# Patient Record
Sex: MALE | Race: WHITE | NOT HISPANIC OR LATINO | Employment: OTHER | URBAN - METROPOLITAN AREA
[De-identification: names, ages, dates, MRNs, and addresses within clinical notes are randomized per-mention and may not be internally consistent; named-entity substitution may affect disease eponyms.]

---

## 2017-01-24 ENCOUNTER — HOSPITAL ENCOUNTER (OUTPATIENT)
Dept: ORTHOPEDIC SURGERY | Facility: CLINIC | Age: 72
Discharge: HOME | End: 2017-01-24
Attending: ORTHOPAEDIC SURGERY | Admitting: ORTHOPAEDIC SURGERY

## 2017-04-07 ENCOUNTER — HOSPITAL ENCOUNTER (OUTPATIENT)
Dept: ORTHOPEDIC SURGERY | Facility: CLINIC | Age: 72
Discharge: HOME | End: 2017-04-07
Attending: PHYSICIAN ASSISTANT | Admitting: PHYSICIAN ASSISTANT

## 2018-03-22 ENCOUNTER — HOSPITAL ENCOUNTER (OUTPATIENT)
Dept: ORTHOPEDIC SURGERY | Facility: CLINIC | Age: 73
Discharge: HOME | End: 2018-03-22
Attending: ORTHOPAEDIC SURGERY | Admitting: ORTHOPAEDIC SURGERY

## 2022-05-20 ENCOUNTER — OFFICE VISIT (OUTPATIENT)
Dept: ORTHOPEDIC SURGERY | Facility: CLINIC | Age: 77
End: 2022-05-20

## 2022-05-20 VITALS
HEIGHT: 73 IN | BODY MASS INDEX: 26.19 KG/M2 | SYSTOLIC BLOOD PRESSURE: 163 MMHG | DIASTOLIC BLOOD PRESSURE: 107 MMHG | HEART RATE: 99 BPM | WEIGHT: 197.6 LBS

## 2022-05-20 DIAGNOSIS — M71.21 BAKER'S CYST OF KNEE, RIGHT: ICD-10-CM

## 2022-05-20 DIAGNOSIS — E66.3 OVERWEIGHT (BMI 25.0-29.9): ICD-10-CM

## 2022-05-20 DIAGNOSIS — M17.11 PRIMARY OSTEOARTHRITIS OF RIGHT KNEE: Primary | ICD-10-CM

## 2022-05-20 PROBLEM — Z87.891 HISTORY OF TOBACCO USE: Status: ACTIVE | Noted: 2022-05-20

## 2022-05-20 PROBLEM — G56.00 CARPAL TUNNEL SYNDROME: Status: ACTIVE | Noted: 2022-05-20

## 2022-05-20 PROCEDURE — 99203 OFFICE O/P NEW LOW 30 MIN: CPT | Performed by: PHYSICIAN ASSISTANT

## 2022-05-20 PROCEDURE — 20611 DRAIN/INJ JOINT/BURSA W/US: CPT | Performed by: PHYSICIAN ASSISTANT

## 2022-05-20 RX ORDER — LISINOPRIL AND HYDROCHLOROTHIAZIDE 12.5; 1 MG/1; MG/1
1 TABLET ORAL DAILY
COMMUNITY
Start: 2022-03-28

## 2022-05-20 RX ORDER — MULTIVITAMIN
1 CAPSULE ORAL DAILY
COMMUNITY
Start: 2016-07-01 | End: 2023-03-03

## 2022-05-20 RX ORDER — LIDOCAINE HYDROCHLORIDE 10 MG/ML
3 INJECTION, SOLUTION EPIDURAL; INFILTRATION; INTRACAUDAL; PERINEURAL
Status: COMPLETED | OUTPATIENT
Start: 2022-05-20 | End: 2022-05-20

## 2022-05-20 RX ADMIN — LIDOCAINE HYDROCHLORIDE 3 ML: 10 INJECTION, SOLUTION EPIDURAL; INFILTRATION; INTRACAUDAL; PERINEURAL at 10:08

## 2022-05-20 NOTE — PROGRESS NOTES
ORTHOPEDIC VISIT    Referring Provider: Referring  Primary Care Provider: Provider, No Known         Subjective:  Chief Complaint:  Chief Complaint   Patient presents with   • Right Knee - Initial Evaluation     X several months, piece of wood fell off truck on to knee, swelling behind knee per patient. Saw Ortho in [place] 2022 requested they drain back of knee, states would only give steroid shot. Shot did not work. Wants back of knee drained today.       HPI:  Amauri Espinal is a 76 y.o. male who presents for his initial visit for right knee pain ongoing since October when he dropped a stack of wood on his knee.  He describes a very intermittent dull, achy pain, mainly in the medial compartment and posteriorly.  He denies any radiation, numbness, or tingling.  He denies any mechanical symptoms or instability.  He reports that his pain did increase in February after an intra-articular steroid injection.  He does use anti-inflammatories as needed, which does help with his discomfort.  He denies any previous history of surgery on the knee.    Past Medical History:   Diagnosis Date   • Arthritis    • Hypertension        Past Surgical History:   Procedure Laterality Date   • BACK SURGERY     • CARPAL TUNNEL RELEASE Right     Dr. Ordonez   • TOTAL HIP ARTHROPLASTY Right 2016    Dr. Mon   • TOTAL HIP ARTHROPLASTY Left 2017    Dr. oMn   • TRIGGER FINGER RELEASE Right     Dr. Ordonez       Family History   Problem Relation Age of Onset   • Arthritis Father        Social History     Occupational History   • Not on file   Tobacco Use   • Smoking status: Former Smoker     Packs/day: 1.00     Years: 30.00     Pack years: 30.00     Types: Cigarettes     Quit date:      Years since quittin.4   • Smokeless tobacco: Never Used   Vaping Use   • Vaping Use: Never used   Substance and Sexual Activity   • Alcohol use: Never   • Drug use: Never   • Sexual activity: Defer        Medications:    Current  "Outpatient Medications:   •  lisinopril-hydrochlorothiazide (PRINZIDE,ZESTORETIC) 10-12.5 MG per tablet, Take 1 tablet by mouth Daily., Disp: , Rfl:   •  Multiple Vitamin (multivitamin) capsule, Take 1 capsule by mouth Daily., Disp: , Rfl:   •  piroxicam (FELDENE) 20 MG capsule, Take 1 capsule by mouth Daily., Disp: , Rfl:     Allergies:  No Known Allergies      Review of Systems:  Gen -no fever, chills , sweats, headache   Eyes - no irritation or discharge   ENT -  no ear pain , runny nose , sore throat , difficulty swallowing   Resp - no cough , congestion , excessive expectoration   CVS - no chest pain , palpitations.   Abd - no pain , nausea , vomiting , diarrhea   Skin - no rash , lesions.   Neuro - no dizziness    Please see HPI for any other pertinent positives.  All other systems were reviewed and are negative.       Objective   Objective:    BP (!) 163/107 (BP Location: Left arm, Patient Position: Sitting, Cuff Size: Large Adult)   Pulse 99   Ht 185.4 cm (73\")   Wt 89.6 kg (197 lb 9.6 oz)   BMI 26.07 kg/m²     Physical Examination:  Alert, oriented, overweight individual in no acute distress, ambulating unassisted  Right lower extremity shows no erythema, rashes, or open skin lesions. There is a minimal amount of swelling, however there is a palpable Baker's cyst posteriorly.  There is a varus malalignment present, and the patient is neurovascularly intact distally. The knee is stable to varus and valgus stress, there is no patellar maltracking noted, and plantar and dorsiflexion is 5/5.  There is crepitus noted.  There is no tenderness to palpation or with range of motion, which is about 0-115.           Imaging:  xrays obtained today  right Knee X-Ray    Date of exam: 5/20/2022    Indication: Right knee pain    AP, Lateral, Ronco views    Findings:Shows moderate to severe tricompartmental DJD, worse in the medial compartment, There is subchondral sclerosis, subchondral cysts, and osteophytosis " present., No fractures or dislocations are appreciated and There is a varus deformity.    decreased joint spaces    Hardware appropriately positioned not applicable    no prior studies available for comparison.    This patient's x-ray report was graded according to the Kellgren and Vickey classification.  This took into account the joint space narrowing, osteophyte formation, sclerosis of the distal femur/proximal tibia along with deformity of those bones.  The findings were indicative of K L grade 4.    X-RAY was ordered and reviewed by URIAH Blunt            Assessment:  1. Primary osteoarthritis of right knee    2. Baker's cyst of knee, right    3. Overweight (BMI 25.0-29.9)                 Plan:  Ultrasound-guided Baker's cyst aspiration today, risks and benefits were discussed and post aspiration instructions were given.  I did discuss with the patient that these often return, however he would still like the aspiration.  He was instructed to wrap the knee with an Ace wrap and continue compression for at least 3 days.  He may continue to use his anti-inflammatories as needed.  Weight loss is recommended.  He may follow-up as needed.  Natural history and expected course discussed. Questions answered.  Educational materials distributed.  Rest, ice, compression, and elevation (RICE) therapy.  OTC analgesics as needed.  Arthrocentesis. See procedure note.  cortisone injections  viscosupplementation  physical therapy  bracing  weight loss  activtiy modification  - Large Joint Arthrocentesis: R knee (Rubio's cyst) on 5/20/2022 10:08 AM  Indications: pain and joint swelling  Details: 18 G needle, ultrasound-guided posterior approach  Medications: 3 mL lidocaine PF 1% 1 %  Aspirate: 20 mL clear, serous and yellow  Outcome: tolerated well, no immediate complications  Procedure, treatment alternatives, risks and benefits explained, specific risks discussed. Consent was given by the patient. Immediately prior to  "procedure a time out was called to verify the correct patient, procedure, equipment, support staff and site/side marked as required. Patient was prepped and draped in the usual sterile fashion.                    URIAH Blunt  05/20/22  10:11 EDT    \"Please note that portions of this note were completed with a voice recognition program\".   "

## 2022-08-22 ENCOUNTER — OFFICE VISIT (OUTPATIENT)
Dept: ORTHOPEDIC SURGERY | Facility: CLINIC | Age: 77
End: 2022-08-22

## 2022-08-22 VITALS — OXYGEN SATURATION: 100 % | BODY MASS INDEX: 26.11 KG/M2 | HEIGHT: 73 IN | TEMPERATURE: 103 F | WEIGHT: 197 LBS

## 2022-08-22 DIAGNOSIS — M17.11 PRIMARY OSTEOARTHRITIS OF RIGHT KNEE: Primary | ICD-10-CM

## 2022-08-22 DIAGNOSIS — E66.3 OVERWEIGHT (BMI 25.0-29.9): ICD-10-CM

## 2022-08-22 DIAGNOSIS — M71.21 BAKER'S CYST OF KNEE, RIGHT: ICD-10-CM

## 2022-08-22 PROCEDURE — 20610 DRAIN/INJ JOINT/BURSA W/O US: CPT | Performed by: PHYSICIAN ASSISTANT

## 2022-08-22 PROCEDURE — 99213 OFFICE O/P EST LOW 20 MIN: CPT | Performed by: PHYSICIAN ASSISTANT

## 2022-08-22 PROCEDURE — 20611 DRAIN/INJ JOINT/BURSA W/US: CPT | Performed by: PHYSICIAN ASSISTANT

## 2022-08-22 RX ORDER — DICLOFENAC SODIUM 75 MG/1
75 TABLET, DELAYED RELEASE ORAL 2 TIMES DAILY
Qty: 60 TABLET | Refills: 2 | Status: SHIPPED | OUTPATIENT
Start: 2022-08-22 | End: 2022-11-16

## 2022-08-22 RX ORDER — LIDOCAINE HYDROCHLORIDE 10 MG/ML
3 INJECTION, SOLUTION EPIDURAL; INFILTRATION; INTRACAUDAL; PERINEURAL
Status: COMPLETED | OUTPATIENT
Start: 2022-08-22 | End: 2022-08-22

## 2022-08-22 RX ORDER — LIDOCAINE HYDROCHLORIDE 10 MG/ML
2 INJECTION, SOLUTION EPIDURAL; INFILTRATION; INTRACAUDAL; PERINEURAL
Status: COMPLETED | OUTPATIENT
Start: 2022-08-22 | End: 2022-08-22

## 2022-08-22 RX ORDER — MELOXICAM 15 MG/1
TABLET ORAL
COMMUNITY
Start: 2022-07-12 | End: 2022-08-22 | Stop reason: ALTCHOICE

## 2022-08-22 RX ORDER — TRIAMCINOLONE ACETONIDE 40 MG/ML
80 INJECTION, SUSPENSION INTRA-ARTICULAR; INTRAMUSCULAR
Status: COMPLETED | OUTPATIENT
Start: 2022-08-22 | End: 2022-08-22

## 2022-08-22 RX ADMIN — TRIAMCINOLONE ACETONIDE 80 MG: 40 INJECTION, SUSPENSION INTRA-ARTICULAR; INTRAMUSCULAR at 17:19

## 2022-08-22 RX ADMIN — LIDOCAINE HYDROCHLORIDE 3 ML: 10 INJECTION, SOLUTION EPIDURAL; INFILTRATION; INTRACAUDAL; PERINEURAL at 17:18

## 2022-08-22 RX ADMIN — LIDOCAINE HYDROCHLORIDE 2 ML: 10 INJECTION, SOLUTION EPIDURAL; INFILTRATION; INTRACAUDAL; PERINEURAL at 17:19

## 2022-08-22 NOTE — PROGRESS NOTES
ORTHO FOLLOW UP       Subjective:    HPI:   Amauri Espinal is a 76 y.o. male who presents in follow-up for his right knee pain with a known history of right knee DJD and Baker's cyst.  He did have a Baker's cyst aspiration on 2022, which did help to reduce his pain.  But he does report that the fluid did slowly return with time.  He states that over the last few weeks his pain has increased over the medial and posterior aspects of the knee.  He is currently taking meloxicam, but has not seen a significant difference in his pain.      Past Medical History:   Diagnosis Date   • Arthritis    • Baker's cyst of knee, right 2022   • Hypertension    • Primary osteoarthritis of right knee 2022       Past Surgical History:   Procedure Laterality Date   • BACK SURGERY     • CARPAL TUNNEL RELEASE Right     Dr. Ordonez   • TOTAL HIP ARTHROPLASTY Right 2016    Dr. Mon   • TOTAL HIP ARTHROPLASTY Left 2017    Dr. Mon   • TRIGGER FINGER RELEASE Right     Dr. Ordonez       Social History     Occupational History   • Not on file   Tobacco Use   • Smoking status: Former Smoker     Packs/day: 1.00     Years: 30.00     Pack years: 30.00     Types: Cigarettes     Quit date:      Years since quittin.6   • Smokeless tobacco: Never Used   Vaping Use   • Vaping Use: Never used   Substance and Sexual Activity   • Alcohol use: Never   • Drug use: Never   • Sexual activity: Defer      The following portions of the patient's history were reviewed and updated as appropriate: allergies, current medications, past family history, past medical history, past social history, past surgical history and problem list.    Medications:    Current Outpatient Medications:   •  lisinopril-hydrochlorothiazide (PRINZIDE,ZESTORETIC) 10-12.5 MG per tablet, Take 1 tablet by mouth Daily., Disp: , Rfl:   •  Multiple Vitamin (multivitamin) capsule, Take 1 capsule by mouth Daily., Disp: , Rfl:   •  diclofenac (VOLTAREN) 75 MG  "EC tablet, Take 1 tablet by mouth 2 (Two) Times a Day. Prn joint pain, Disp: 60 tablet, Rfl: 2    Allergies:  No Known Allergies    Review of Systems:  Gen -no fever, chills , sweats, headache   Eyes - no irritation or discharge   ENT -  no ear pain , runny nose , sore throat , difficulty swallowing   Resp - no cough , congestion , excessive expectoration   CVS - no chest pain , palpitations.   Abd - no pain , nausea , vomiting , diarrhea   Skin - no rash , lesions.   Neuro - no dizziness    Please see HPI for any other pertinent positives.  All other systems were reviewed and are negative.       Objective   Objective:    Temp (!) 103 °F (39.4 °C)   Ht 185.4 cm (73\")   Wt 89.4 kg (197 lb)   SpO2 100%   BMI 25.99 kg/m²     Physical Examination:  Alert, oriented, overweight individual in no acute distress, ambulating unassisted  Right lower extremity shows no erythema, rashes, or open skin lesions. There is a minimal amount of swelling, however there is a palpable Baker's cyst posteriorly.  There is a varus malalignment present, and the patient is neurovascularly intact distally. The knee is stable to varus and valgus stress, there is no patellar maltracking noted, and plantar and dorsiflexion is 5/5.  There is crepitus noted.  There is no tenderness to palpation or with range of motion, which is about 0-115.         Imaging:  no diagnostic testing performed this visit            Assessment:  1. Primary osteoarthritis of right knee    2. Baker's cyst of knee, right    3. Overweight (BMI 25.0-29.9)                 Plan:   Ultrasound-guided Baker's cyst aspiration today, risks and benefits were discussed and post aspiration instructions were given.  I did discuss with the patient that these often return, however he would still like the aspiration.  I am also recommending conservative treatment for his arthritis.  He will be fitted for hinged knee brace today to help with stability and fall prevention.  I have asked " him to stop the meloxicam, and we will try diclofenac.  Intra-articular steroid injection today, risks and benefits were discussed and postinjection instructions were given.  I will plan to see him back in 3 months and as needed.  He should call with any questions or concerns.  Natural history and expected course discussed. Questions answered.  Educational materials distributed.  Rest, ice, compression, and elevation (RICE) therapy.  NSAIDs per medication orders.  OTC analgesics as needed.  Arthrocentesis. See procedure note.  cortisone injections  viscosupplementation  physical therapy  bracing  weight loss  activtiy modification  assistive devices  - Large Joint Arthrocentesis: R knee (Rubio's cyst) on 8/22/2022 5:18 PM  Indications: pain and joint swelling  Details: 18 G needle, ultrasound-guided posterior approach  Medications: 3 mL lidocaine PF 1% 1 %  Aspirate: 15 mL clear, serous and yellow  Outcome: tolerated well, no immediate complications  Procedure, treatment alternatives, risks and benefits explained, specific risks discussed. Consent was given by the patient. Immediately prior to procedure a time out was called to verify the correct patient, procedure, equipment, support staff and site/side marked as required. Patient was prepped and draped in the usual sterile fashion.     - Large Joint Arthrocentesis: R knee on 8/22/2022 5:19 PM  Indications: pain  Details: 25 G needle, anterolateral approach  Medications: 2 mL lidocaine PF 1% 1 %; 80 mg triamcinolone acetonide 40 MG/ML  Outcome: tolerated well, no immediate complications  Procedure, treatment alternatives, risks and benefits explained, specific risks discussed. Consent was given by the patient. Immediately prior to procedure a time out was called to verify the correct patient, procedure, equipment, support staff and site/side marked as required. Patient was prepped and draped in the usual sterile fashion.                      Sigrid Tesfaye,  "PA  08/22/22  17:14 EDT    \"Please note that portions of this note were completed with a voice recognition program\".   "

## 2022-11-16 DIAGNOSIS — M17.11 PRIMARY OSTEOARTHRITIS OF RIGHT KNEE: ICD-10-CM

## 2022-11-16 RX ORDER — DICLOFENAC SODIUM 75 MG/1
TABLET, DELAYED RELEASE ORAL
Qty: 60 TABLET | Refills: 11 | Status: SHIPPED | OUTPATIENT
Start: 2022-11-16

## 2022-12-02 ENCOUNTER — OFFICE VISIT (OUTPATIENT)
Dept: ORTHOPEDIC SURGERY | Facility: CLINIC | Age: 77
End: 2022-12-02

## 2022-12-02 VITALS — WEIGHT: 197 LBS | HEIGHT: 73 IN | BODY MASS INDEX: 26.11 KG/M2 | OXYGEN SATURATION: 97 % | HEART RATE: 84 BPM

## 2022-12-02 DIAGNOSIS — M71.21 BAKER'S CYST OF KNEE, RIGHT: ICD-10-CM

## 2022-12-02 DIAGNOSIS — M17.11 PRIMARY OSTEOARTHRITIS OF RIGHT KNEE: Primary | ICD-10-CM

## 2022-12-02 DIAGNOSIS — E66.3 OVERWEIGHT (BMI 25.0-29.9): ICD-10-CM

## 2022-12-02 PROCEDURE — 20610 DRAIN/INJ JOINT/BURSA W/O US: CPT | Performed by: PHYSICIAN ASSISTANT

## 2022-12-02 RX ORDER — LIDOCAINE HYDROCHLORIDE 10 MG/ML
2 INJECTION, SOLUTION EPIDURAL; INFILTRATION; INTRACAUDAL; PERINEURAL
Status: COMPLETED | OUTPATIENT
Start: 2022-12-02 | End: 2022-12-02

## 2022-12-02 RX ORDER — TRIAMCINOLONE ACETONIDE 40 MG/ML
80 INJECTION, SUSPENSION INTRA-ARTICULAR; INTRAMUSCULAR
Status: COMPLETED | OUTPATIENT
Start: 2022-12-02 | End: 2022-12-02

## 2022-12-02 RX ADMIN — LIDOCAINE HYDROCHLORIDE 2 ML: 10 INJECTION, SOLUTION EPIDURAL; INFILTRATION; INTRACAUDAL; PERINEURAL at 09:55

## 2022-12-02 RX ADMIN — TRIAMCINOLONE ACETONIDE 80 MG: 40 INJECTION, SUSPENSION INTRA-ARTICULAR; INTRAMUSCULAR at 09:55

## 2022-12-02 NOTE — PROGRESS NOTES
"    ORTHO FOLLOW UP       Subjective:    HPI:   Amauri Espinal is a 77 y.o. male who presents in follow-up for his right knee pain with a known history of right knee DJD.  His last intra-articular steroid injection was on 2022, which significantly reduce his pain.      Past Medical History:   Diagnosis Date   • Arthritis    • Baker's cyst of knee, right 2022   • Hypertension    • Primary osteoarthritis of right knee 2022       Past Surgical History:   Procedure Laterality Date   • BACK SURGERY     • CARPAL TUNNEL RELEASE Right     Dr. Ordonez   • TOTAL HIP ARTHROPLASTY Right 2016    Dr. Mon   • TOTAL HIP ARTHROPLASTY Left 2017    Dr. Mon   • TRIGGER FINGER RELEASE Right     Dr. Ordonez       Social History     Occupational History   • Not on file   Tobacco Use   • Smoking status: Former     Packs/day: 1.00     Years: 30.00     Pack years: 30.00     Types: Cigarettes     Quit date:      Years since quittin.9   • Smokeless tobacco: Never   Vaping Use   • Vaping Use: Never used   Substance and Sexual Activity   • Alcohol use: Never   • Drug use: Never   • Sexual activity: Defer      The following portions of the patient's history were reviewed and updated as appropriate: allergies, current medications, past family history, past medical history, past social history, past surgical history and problem list.    Medications:    Current Outpatient Medications:   •  diclofenac (VOLTAREN) 75 MG EC tablet, TAKE 1 TABLET BY MOUTH TWICE DAILY AS NEEDED FOR JOINT PAIN, Disp: 60 tablet, Rfl: 11  •  lisinopril-hydrochlorothiazide (PRINZIDE,ZESTORETIC) 10-12.5 MG per tablet, Take 1 tablet by mouth Daily., Disp: , Rfl:   •  Multiple Vitamin (multivitamin) capsule, Take 1 capsule by mouth Daily., Disp: , Rfl:     Allergies:  No Known Allergies       Objective   Objective:    Pulse 84   Ht 185.4 cm (73\")   Wt 89.4 kg (197 lb)   SpO2 97%   BMI 25.99 kg/m²     Physical Examination:  Alert, " "oriented, overweight individual in no acute distress, ambulating unassisted  Right lower extremity shows no erythema, rashes, or open skin lesions. There is a mild amount of swelling. There is a varus malalignment present, and the patient is neurovascularly intact distally. The knee is stable to varus and valgus stress, there is no patellar maltracking noted, and plantar and dorsiflexion is 5/5.  There is crepitus noted.  There is no tenderness to palpation or with range of motion, which is about 0-115.         Imaging:  no diagnostic testing performed this visit            Assessment:  1. Primary osteoarthritis of right knee    2. Baker's cyst of knee, right    3. Overweight (BMI 25.0-29.9)                 Plan:  Intra-articular steroid injection today, risks and benefits were discussed and postinjection instructions were given.  Continue bracing.  Follow-up in 3 months for recheck.  - Large Joint Arthrocentesis: R knee on 12/2/2022 9:55 AM  Indications: pain  Details: 25 G needle, anterolateral approach  Medications: 2 mL lidocaine PF 1% 1 %; 80 mg triamcinolone acetonide 40 MG/ML  Outcome: tolerated well, no immediate complications  Procedure, treatment alternatives, risks and benefits explained, specific risks discussed. Consent was given by the patient. Immediately prior to procedure a time out was called to verify the correct patient, procedure, equipment, support staff and site/side marked as required. Patient was prepped and draped in the usual sterile fashion.                    URIAH Blunt  12/02/22  09:52 EST    \"Please note that portions of this note were completed with a voice recognition program\".   "

## 2023-03-03 ENCOUNTER — OFFICE VISIT (OUTPATIENT)
Dept: ORTHOPEDIC SURGERY | Facility: CLINIC | Age: 78
End: 2023-03-03
Payer: MEDICARE

## 2023-03-03 VITALS — HEART RATE: 72 BPM | WEIGHT: 197 LBS | OXYGEN SATURATION: 98 % | HEIGHT: 73 IN | BODY MASS INDEX: 26.11 KG/M2

## 2023-03-03 DIAGNOSIS — M17.11 PRIMARY OSTEOARTHRITIS OF RIGHT KNEE: Primary | ICD-10-CM

## 2023-03-03 PROCEDURE — 99213 OFFICE O/P EST LOW 20 MIN: CPT | Performed by: FAMILY MEDICINE

## 2023-03-03 PROCEDURE — 20610 DRAIN/INJ JOINT/BURSA W/O US: CPT | Performed by: FAMILY MEDICINE

## 2023-03-03 RX ADMIN — TRIAMCINOLONE ACETONIDE 80 MG: 40 INJECTION, SUSPENSION INTRA-ARTICULAR; INTRAMUSCULAR at 17:47

## 2023-03-03 NOTE — PROGRESS NOTES
Primary Care Sports Medicine Office Visit Note     Patient ID: Amauri Espinal is a 77 y.o. male.    Chief Complaint:  Chief Complaint   Patient presents with   • Right Knee - Pain, Initial Evaluation     HPI:    Mr. Amauri Espinal is a 77 y.o. male. The patient presents to the clinic today for repeat evaluation of his right knee. He is a former patient of Love Tesfaye PA-C. He most recently received a corticosteroid injection dated 2022.    The patient states that his right knee is bothering him a little bit. He reports that the injection helped. He states that on 2023, his right knee was very painful, but today it does not hurt much. He reports that he has not been on his right knee much today. He states that the injection really helps. He reports that it depends on what he does the day before, and if he is using his right knee a lot. He reports that if he is getting down and working on his right knee, his right knee is very painful the next day.    Past Medical History:   Diagnosis Date   • Arthritis    • Baker's cyst of knee, right 2022   • Hypertension    • Primary osteoarthritis of right knee 2022       Past Surgical History:   Procedure Laterality Date   • BACK SURGERY     • CARPAL TUNNEL RELEASE Right     Dr. Ordonez   • TOTAL HIP ARTHROPLASTY Right 2016    Dr. Mon   • TOTAL HIP ARTHROPLASTY Left 2017    Dr. Mon   • TRIGGER FINGER RELEASE Right     Dr. Ordonez       Family History   Problem Relation Age of Onset   • Arthritis Father      Social History     Occupational History   • Not on file   Tobacco Use   • Smoking status: Former     Packs/day: 1.00     Years: 30.00     Pack years: 30.00     Types: Cigarettes     Quit date:      Years since quittin.1   • Smokeless tobacco: Never   Vaping Use   • Vaping Use: Never used   Substance and Sexual Activity   • Alcohol use: Never   • Drug use: Never   • Sexual activity: Defer      Review of Systems   Constitutional:  "Negative for activity change, fatigue and fever.   Musculoskeletal: Positive for arthralgias.   Skin: Negative for color change and rash.   Neurological: Negative for numbness.     Objective:    Pulse 72   Ht 185.4 cm (73\")   Wt 89.4 kg (197 lb)   SpO2 98%   BMI 25.99 kg/m²     Physical Examination:  Physical Exam  Vitals and nursing note reviewed.   Constitutional:       General: He is not in acute distress.     Appearance: He is well-developed. He is not diaphoretic.   HENT:      Head: Normocephalic and atraumatic.   Eyes:      Conjunctiva/sclera: Conjunctivae normal.   Pulmonary:      Effort: Pulmonary effort is normal. No respiratory distress.   Musculoskeletal:      Right knee: No effusion.      Instability Tests: Medial Yessi test negative and lateral Yessi test negative.   Skin:     General: Skin is warm.      Capillary Refill: Capillary refill takes less than 2 seconds.   Neurological:      Mental Status: He is alert.       Right Ankle Exam     Range of Motion   The patient has normal right ankle ROM.      Right Knee Exam     Muscle Strength   The patient has normal right knee strength.    Tenderness   The patient is experiencing tenderness in the lateral joint line, medial joint line and patella.    Range of Motion   Extension: normal   Flexion: normal     Tests   Yessi:  Medial - negative Lateral - negative  Varus: negative Valgus: negative  Right knee patellar apprehension test: +patellar grind, +hilda king.    Other   Erythema: absent  Sensation: normal  Pulse: present (distal to the knee, DP and PT palpable)  Swelling: none  Effusion: no effusion present           Imaging and other tests:  No new imaging today.      Assessment and Plan:    1. Primary osteoarthritis of the right knee    After discussion of risks and benefits, the patient elected to proceed with corticosteroid injection to the right knee.  The patient tolerated this procedure well without any complaints or problems.  I " recommended continuation of conservative management as previous, return to clinic in 3-6 months or sooner if symptoms recur.    Transcribed from ambient dictation for Rachid Sawant II,  by Mgehan Cooper.  03/03/23   11:29 EST    Patient or patient representative verbalized consent to the visit recording.  I have personally performed the services described in this document as transcribed by the above individual, and it is both accurate and complete.    Disclaimer: Please note that areas of this note were completed with computer voice recognition software.  Quite often unanticipated grammatical, syntax, homophones, and other interpretive errors are inadvertently transcribed by the computer software. Please excuse any errors that have escaped final proofreading.

## 2023-03-06 RX ORDER — TRIAMCINOLONE ACETONIDE 40 MG/ML
80 INJECTION, SUSPENSION INTRA-ARTICULAR; INTRAMUSCULAR
Status: COMPLETED | OUTPATIENT
Start: 2023-03-03 | End: 2023-03-03

## 2023-03-06 NOTE — PROGRESS NOTES
Procedure   Large Joint Arthrocentesis: R knee  Date/Time: 3/3/2023 5:47 PM  Consent given by: patient  Site marked: site marked  Timeout: Immediately prior to procedure a time out was called to verify the correct patient, procedure, equipment, support staff and site/side marked as required   Supporting Documentation  Indications: pain   Procedure Details  Location: knee - R knee  Preparation: Patient was prepped and draped in the usual sterile fashion  Needle size: 25 G  Approach: anteromedial  Medications administered: 80 mg triamcinolone acetonide 40 MG/ML (2cc of 1% lidocaine without epinepherine, and 2cc of 40mg Kenalog)  Patient tolerance: patient tolerated the procedure well with no immediate complications (Blood loss negligable, pt admits to immediate decrease in pain and improved ROM with gentle ambulation post injection.)

## 2023-06-05 ENCOUNTER — OFFICE VISIT (OUTPATIENT)
Dept: ORTHOPEDIC SURGERY | Facility: CLINIC | Age: 78
End: 2023-06-05
Payer: MEDICARE

## 2023-06-05 VITALS
RESPIRATION RATE: 18 BRPM | BODY MASS INDEX: 24.25 KG/M2 | HEIGHT: 73 IN | HEART RATE: 78 BPM | WEIGHT: 183 LBS | OXYGEN SATURATION: 98 %

## 2023-06-05 DIAGNOSIS — M17.11 PRIMARY OSTEOARTHRITIS OF RIGHT KNEE: Primary | ICD-10-CM

## 2023-06-05 NOTE — PROGRESS NOTES
"Primary Care Sports Medicine Office Visit Note     Patient ID: Amauri Espinal is a 77 y.o. male.    Chief Complaint:  Chief Complaint   Patient presents with    Right Knee - Follow-up    Injections     HPI:    Mr. Amauri Espinal is a 77 y.o. male. The patient presents to the clinic today for follow-up evaluation of right knee pain.    His right knee is bothering him pretty bad. He has a knot on the back of his right knee that is draining out of his knee. He had his right knee drained one time. His right knee is full of whatever drains out. His right knee woke him up this morning. The steroids do not help. He had an injection in his right knee in 2022, and it did not help.    Past Medical History:   Diagnosis Date    Arthritis     Baker's cyst of knee, right 2022    Hypertension     Primary osteoarthritis of right knee 2022       Past Surgical History:   Procedure Laterality Date    BACK SURGERY  1985    CARPAL TUNNEL RELEASE Right     Dr. Ordonez    TOTAL HIP ARTHROPLASTY Right 2016    Dr. Mon    TOTAL HIP ARTHROPLASTY Left 2017    Dr. Mon    TRIGGER FINGER RELEASE Right     Dr. Ordonez       Family History   Problem Relation Age of Onset    Arthritis Father      Social History     Occupational History    Not on file   Tobacco Use    Smoking status: Former     Packs/day: 1.00     Years: 30.00     Pack years: 30.00     Types: Cigarettes     Quit date:      Years since quittin.4    Smokeless tobacco: Never   Vaping Use    Vaping Use: Never used   Substance and Sexual Activity    Alcohol use: Never    Drug use: Never    Sexual activity: Defer      Review of Systems   Constitutional:  Negative for activity change, fatigue and fever.   Musculoskeletal:  Positive for arthralgias.   Skin:  Negative for color change and rash.   Neurological:  Negative for numbness.   Objective:    Pulse 78   Resp 18   Ht 185.4 cm (73\")   Wt 83 kg (183 lb)   SpO2 98%   BMI 24.14 kg/m²     Physical " Examination:  Physical Exam  Vitals and nursing note reviewed.   Constitutional:       General: He is not in acute distress.     Appearance: He is well-developed. He is not diaphoretic.   HENT:      Head: Normocephalic and atraumatic.   Eyes:      Conjunctiva/sclera: Conjunctivae normal.   Pulmonary:      Effort: Pulmonary effort is normal. No respiratory distress.   Skin:     General: Skin is warm.      Capillary Refill: Capillary refill takes less than 2 seconds.   Neurological:      Mental Status: He is alert.     Right Knee Exam     Comments:   Right knee examination yields moderate effusion with palpable suspected Baker's cyst to the posterior portion of the knee, fluid filled 1 cm x 1 cm x at least 1 cm deep fluctuance to the posterolateral popliteal fossa. There is moderate tenderness to palpation along the medial joint line, lateral joint line and positive patellar grind. Range of motion is from 0 to 120 degrees. Varus and valgus stress tests are negative.        Imaging and other tests:      Assessment and Plan:  1. Primary osteoarthritis of the right knee.    After discussion of risks and benefits, the patient elected to proceed with corticosteroid injection to the right knee. The patient tolerated this procedure well without any complaints or problems. I recommended continuation of conservative management as previous, RTC in 3-6 months or sooner if symptoms recur.    Transcribed from ambient dictation for Rachid Sawant II,  by Arcelia Vera.  06/05/23   10:33 EDT    Patient or patient representative verbalized consent to the visit recording.  I have personally performed the services described in this document as transcribed by the above individual, and it is both accurate and complete.    Disclaimer: Please note that areas of this note were completed with computer voice recognition software.  Quite often unanticipated grammatical, syntax, homophones, and other interpretive errors are inadvertently  transcribed by the computer software. Please excuse any errors that have escaped final proofreading.

## 2023-06-06 RX ORDER — TRIAMCINOLONE ACETONIDE 40 MG/ML
80 INJECTION, SUSPENSION INTRA-ARTICULAR; INTRAMUSCULAR
Status: COMPLETED | OUTPATIENT
Start: 2023-06-06 | End: 2023-06-06

## 2023-06-06 RX ADMIN — TRIAMCINOLONE ACETONIDE 80 MG: 40 INJECTION, SUSPENSION INTRA-ARTICULAR; INTRAMUSCULAR at 08:45

## 2023-06-06 NOTE — PROGRESS NOTES
Procedure   Large Joint Arthrocentesis: R knee  Date/Time: 6/6/2023 8:45 AM  Consent given by: patient  Site marked: site marked  Timeout: Immediately prior to procedure a time out was called to verify the correct patient, procedure, equipment, support staff and site/side marked as required   Supporting Documentation  Indications: pain   Procedure Details  Location: knee - R knee  Preparation: Patient was prepped and draped in the usual sterile fashion  Needle size: 25 G  Approach: anteromedial  Medications administered: 80 mg triamcinolone acetonide 40 MG/ML (2cc of 1% lidocaine without epinepherine)  Patient tolerance: patient tolerated the procedure well with no immediate complications (Blood loss negligable, pt admits to immediate decrease in pain and improved ROM with gentle ambulation post injection.)

## 2023-06-08 ENCOUNTER — TELEPHONE (OUTPATIENT)
Dept: ORTHOPEDIC SURGERY | Facility: CLINIC | Age: 78
End: 2023-06-08
Payer: MEDICARE

## 2023-06-08 NOTE — TELEPHONE ENCOUNTER
TONIA for patient to return a call to the office to get an appointment set up with Dr. Keith for his knees.

## 2023-06-08 NOTE — TELEPHONE ENCOUNTER
Provider: DR GUTIÉRREZ    Caller: ALYSHA RAINES    Relationship to Patient: SELF    Phone Number: [phone]    Reason for Call: PT STATES THAT DR GUTIÉRREZ TALKED TO HIM ABOUT SWITCHING DOCTORS TO SOMEONE ELSE AT THE PRACTICE WHO COULD SEE HIM FOR HIS KNEES. PT SAID THAT DR GUTIÉRREZ DID NOT MENTION THE NAME OF WHICH DOCTOR HE WANTED MR RAINES TO SEE.

## 2023-09-13 ENCOUNTER — OFFICE VISIT (OUTPATIENT)
Dept: ORTHOPEDIC SURGERY | Facility: CLINIC | Age: 78
End: 2023-09-13
Payer: MEDICARE

## 2023-09-13 VITALS
OXYGEN SATURATION: 98 % | RESPIRATION RATE: 20 BRPM | WEIGHT: 178.8 LBS | HEART RATE: 78 BPM | HEIGHT: 73 IN | BODY MASS INDEX: 23.7 KG/M2

## 2023-09-13 DIAGNOSIS — G89.29 CHRONIC PAIN OF RIGHT KNEE: Primary | ICD-10-CM

## 2023-09-13 DIAGNOSIS — Z96.643 STATUS POST BILATERAL HIP REPLACEMENTS: ICD-10-CM

## 2023-09-13 DIAGNOSIS — M25.561 CHRONIC PAIN OF RIGHT KNEE: Primary | ICD-10-CM

## 2023-09-13 RX ADMIN — TRIAMCINOLONE ACETONIDE 80 MG: 40 INJECTION, SUSPENSION INTRA-ARTICULAR; INTRAMUSCULAR at 09:23

## 2023-09-13 RX ADMIN — LIDOCAINE HYDROCHLORIDE 2 ML: 10 INJECTION, SOLUTION EPIDURAL; INFILTRATION; INTRACAUDAL; PERINEURAL at 09:23

## 2023-09-13 NOTE — PROGRESS NOTES
"Chief Complaint  Pain and Initial Evaluation of the Right Knee (Pain- 7 pain > 1 year)    Subjective    History of Present Illness      Amauri Espinal is a 77 y.o. male who presents to [facility] for bilateral knee pain and follow-up on bilateral hip arthroplasty.  History of Present Illness the patient is doing well following bilateral hip replacements done about 5 years ago through this office.  He does not have a limb length discrepancy.  There is no neurovascular deficit.  The patient states that he is doing reasonably well from his hip replacement surgeries.  It is now his right knee that is bothering him significantly.  He has had intra-articular injections which have really not helped him at all.  He states that his quality of life is very negative and he wants to proceed with an injection now and then eventually with knee replacement surgery on the right side.  Occasionally the knee will buckle and give out from underneath him.  He has a progressive varus deformity with radiation of pain to the proximal tibia.  Pain Location:  RIGHT knee  Radiation: none  Quality: dull, aching  Intensity/Severity: moderate to severe  Duration:  several years  Progression of symptoms: yes, progressive worsening  Onset quality: gradual   Timing: intermittent  Aggravating Factors: rising after sitting, squatting  Alleviating Factors: NSAIDs  Previous Episodes: yes  Associated Symptoms: pain, swelling  ADLs Affected: ambulating, recreational activities/sports  Previous Treatment: NSAIDs       Objective   Vital Signs:   Pulse 78   Resp 20   Ht 185.4 cm (73\")   Wt 81.1 kg (178 lb 12.8 oz)   SpO2 98%   BMI 23.59 kg/m²     Physical Exam  Physical Exam  Vitals signs and nursing note reviewed.   Constitutional:       Appearance: Normal appearance.   Pulmonary:      Effort: Pulmonary effort is normal.   Skin:     General: Skin is warm and dry.      Capillary Refill: Capillary refill takes less than " 2 seconds.   Neurological:      General: No focal deficit present.      Mental Status: He is alert and oriented to person, place, and time. Mental status is at baseline.   Psychiatric:         Mood and Affect: Mood normal.         Behavior: Behavior normal.         Thought Content: Thought content normal.         Judgment: Judgment normal.     Ortho Exam   Right knee (varus). Patient has crepitus throughout range of motion. Positive patellar grind test. Mild effusion. Lachman is negative. Pivot shift is negative. Anterior and posterior drawer signs are negative. Significant joint line tenderness is noted on the medial aspect of the knee. Patient has a varus orientation of the knee. There is fullness and tenderness in the Popliteal fossa. Mild distention of a Popliteal cyst is noted in this location. Range of motion in flexion is from  degrees. Neurovascular status is intact.  Dorsalis pedis and posterior tibial artery pulses are palpable. Common peroneal nerve function is well preserved. Patient's gait is cautious and antalgic. Skin and soft tissues are mildly swollen, consistent with synovitis and effusion. The patient has a significant limp with the first few steps after starting the gait cycle. Getting out of a chair takes a lot of effort due to pain on knee flexion.           Result Review :  The following data was reviewed by: Forrest Ketih MD on 09/13/2023:  Radiologic studies - see below for interpretation  RIGHT knee xrays  weightbearing/standing ap/lateral views were performed at [facility] on 09/13/2023. Images were independently viewed and interpreted by myself, my impression as follows:    right Knee X-Ray  Indication: Evaluation of pain and discomfort on the medial aspect of the knee.  AP, Lateral views  Findings: Advanced degenerative osteoarthritis with varus deformity of the knee.  Medial joint space is significantly obliterated.  no bony lesion  Soft tissues within normal limits  decreased  joint spaces  Hardware appropriately positioned not applicable      no prior studies available for comparison.    This patient's x-ray report was graded according to the Kellgren and Vickey classification.  This took into account the joint space narrowing, osteophyte formation, sclerosis of the distal femur/proximal tibia along with deformity of those bones.  The findings were indicative of K L grade 3.    X-RAY was ordered and reviewed by Forrest Keith MD         Large Joint Arthrocentesis: R knee  Date/Time: 9/13/2023 9:23 AM  Consent given by: patient  Site marked: site marked  Timeout: Immediately prior to procedure a time out was called to verify the correct patient, procedure, equipment, support staff and site/side marked as required   Supporting Documentation  Indications: pain   Procedure Details  Location: knee - R knee  Preparation: Patient was prepped and draped in the usual sterile fashion  Needle size: 25 G  Approach: anteromedial  Medications administered: 2 mL lidocaine PF 1% 1 %; 80 mg triamcinolone acetonide 40 MG/ML  Patient tolerance: patient tolerated the procedure well with no immediate complications               Assessment   Assessment and Plan    Diagnoses and all orders for this visit:    1. Chronic pain of right knee (Primary)  -     Cancel: XR Knee 1 or 2 View Right  -     XR knee 3 vw right          Follow Up   Compression/brace to prevent the knee from buckling and giving out.  Calcium and vitamin D for bone health.  Risk and benefits of total knee arthroplasty discussed with the patient at length and he would like to proceed with knee replacement surgery after this injection treatment.  Injected patient's right knee with a steroid from an anterolateral approach.  Extensive discussion with the patient about risks benefits potential complications and treatment options over 45 minutes with regards to scheduling the knee replacement surgery.  Rest, ice, compression, and elevation (RICE)  therapy  Stretching and strengthening exercises  OTC Alternate Ibuprofen and Tylenol as needed  Follow up in 3 month(s)  Patient was given instructions and counseling regarding his condition or for health maintenance advice. Please see specific information pulled into the AVS if appropriate.   The patient was seen today for preoperative discussion.  The patient has been tried on over-the-counter and prescription NSAID's despite the risks of anti-inflammatory bleeding, peptic ulcers and erosive gastritis with short term benefit only.  Braces have been prescribed for mechanical support.  Patient has been participating in an exercise program specifically targeting joint pain relief with limited benefit. Intraarticular injections have been used periodically with some but not complete relief of pain.  Ambulation aids have also been utilized.      The details of the surgical procedure were explained including the location of probable incisions and a description of the likely hardware/grafts to be used. The patient understands the likely convalescence after surgery as well as the rehabilitation required.  Also, we have thoroughly discussed with the patient the risks, benefits and alternatives to surgery.  Risks include but are not limited to the risk of infection, joint stiffness, limited range of motion, wound healing problems, scar tissue build up, myocardial infarction, stroke, blood clots (including DVT and/or pulmonary embolus along with the risk of death) neurologic and/or vascular injury, limb length discrepancy, fracture, dislocation, nonunion, malunion, continued pain and need for further surgery including hardware failure requiring revision.      Forrest Keith MD   Date of Encounter: 9/13/2023     EMR Dragon/Transcription disclaimer:  Much of this encounter note is an electronic transcription/translation of spoken language to printed text. The electronic translation of spoken language may permit erroneous, or at  times, nonsensical words or phrases to be inadvertently transcribed; Although I have reviewed the note for such errors, some may still exist.

## 2023-09-27 PROBLEM — M25.561 CHRONIC PAIN OF RIGHT KNEE: Status: ACTIVE | Noted: 2023-09-27

## 2023-09-27 PROBLEM — G89.29 CHRONIC PAIN OF RIGHT KNEE: Status: ACTIVE | Noted: 2023-09-27

## 2023-09-27 RX ORDER — LIDOCAINE HYDROCHLORIDE 10 MG/ML
2 INJECTION, SOLUTION EPIDURAL; INFILTRATION; INTRACAUDAL; PERINEURAL
Status: COMPLETED | OUTPATIENT
Start: 2023-09-13 | End: 2023-09-13

## 2023-09-27 RX ORDER — TRIAMCINOLONE ACETONIDE 40 MG/ML
80 INJECTION, SUSPENSION INTRA-ARTICULAR; INTRAMUSCULAR
Status: COMPLETED | OUTPATIENT
Start: 2023-09-13 | End: 2023-09-13

## 2023-12-13 ENCOUNTER — OFFICE VISIT (OUTPATIENT)
Dept: ORTHOPEDIC SURGERY | Facility: CLINIC | Age: 78
End: 2023-12-13
Payer: MEDICARE

## 2023-12-13 DIAGNOSIS — G89.29 CHRONIC PAIN OF RIGHT KNEE: Primary | ICD-10-CM

## 2023-12-13 DIAGNOSIS — M17.11 PRIMARY OSTEOARTHRITIS OF RIGHT KNEE: ICD-10-CM

## 2023-12-13 DIAGNOSIS — M25.561 CHRONIC PAIN OF RIGHT KNEE: Primary | ICD-10-CM

## 2023-12-13 RX ORDER — DICLOFENAC SODIUM 75 MG/1
75 TABLET, DELAYED RELEASE ORAL 2 TIMES DAILY PRN
Qty: 60 TABLET | Refills: 2 | Status: SHIPPED | OUTPATIENT
Start: 2023-12-13

## 2023-12-13 RX ADMIN — LIDOCAINE HYDROCHLORIDE 2 ML: 10 INJECTION, SOLUTION EPIDURAL; INFILTRATION; INTRACAUDAL; PERINEURAL at 09:48

## 2023-12-13 RX ADMIN — METHYLPREDNISOLONE ACETATE 160 MG: 80 INJECTION, SUSPENSION INTRA-ARTICULAR; INTRALESIONAL; INTRAMUSCULAR; SOFT TISSUE at 09:48

## 2023-12-13 NOTE — PROGRESS NOTES
INJECTION    Patient: Amauri Espinal    YOB: 1945    MRN: [id number]    Chief Complaint   Patient presents with    Right Knee - Follow-up       History of Present Illness: Patient returns today for right knee pain.  His pain is located over the medial aspect of the joint.  The pain has been progressive in nature and remains intermittent .  His pain is worsened by rising after sitting. There has been improvement in the past with injections.     This problem is not new to this examiner.     Allergies: No Known Allergies    Medications:   Home Medications:  Current Outpatient Medications on File Prior to Visit   Medication Sig    lisinopril-hydrochlorothiazide (PRINZIDE,ZESTORETIC) 10-12.5 MG per tablet Take 1 tablet by mouth Daily.     No current facility-administered medications on file prior to visit.     Current Medications:  Scheduled Meds:  PRN Meds:.    I have reviewed the patient's medical history in detail and updated the computerized patient record.  Review and summarization of old records include:    Past Medical History:   Diagnosis Date    Arthritis     Baker's cyst of knee, right 2022    Hypertension     Primary osteoarthritis of right knee 2022     Past Surgical History:   Procedure Laterality Date    BACK SURGERY  1985    CARPAL TUNNEL RELEASE Right     Dr. Ordonez    TOTAL HIP ARTHROPLASTY Right 2016    Dr. Mon    TOTAL HIP ARTHROPLASTY Left 2017    Dr. Mon    TRIGGER FINGER RELEASE Right     Dr. Ordonez     Social History     Occupational History    Not on file   Tobacco Use    Smoking status: Former     Packs/day: 1.00     Years: 30.00     Additional pack years: 0.00     Total pack years: 30.00     Types: Cigarettes     Quit date:      Years since quittin.9    Smokeless tobacco: Never   Vaping Use    Vaping Use: Never used   Substance and Sexual Activity    Alcohol use: Never    Drug use: Never    Sexual activity: Defer      Social History     Social  "History Narrative    Not on file     Family History   Problem Relation Age of Onset    Arthritis Father        Review of Systems        Wt Readings from Last 3 Encounters:   09/13/23 81.1 kg (178 lb 12.8 oz)   06/05/23 83 kg (183 lb)   03/03/23 89.4 kg (197 lb)     Ht Readings from Last 3 Encounters:   09/13/23 185.4 cm (73\")   06/05/23 185.4 cm (73\")   03/03/23 185.4 cm (73\")     There is no height or weight on file to calculate BMI.  No height and weight on file for this encounter.  There were no vitals filed for this visit.    Physical Exam    Musculoskeletal:    Right knee (varus). Patient has crepitus throughout range of motion. Positive patellar grind test. Mild effusion. Lachman is negative. Pivot shift is negative. Anterior and posterior drawer signs are negative. Significant joint line tenderness is noted on the medial aspect of the knee. Patient has a varus orientation of the knee. There is fullness and tenderness in the Popliteal fossa. Mild distention of a Popliteal cyst is noted in this location. Range of motion in flexion is from  degrees. Neurovascular status is intact.  Dorsalis pedis and posterior tibial artery pulses are palpable. Common peroneal nerve function is well preserved. Patient's gait is cautious and antalgic. Skin and soft tissues are mildly swollen, consistent with synovitis and effusion. The patient has a significant limp with the first few steps after starting the gait cycle. Getting out of a chair takes a lot of effort due to pain on knee flexion.       Diagnostics:      Procedure:  Large Joint Arthrocentesis: R knee  Date/Time: 12/13/2023 9:48 AM  Consent given by: patient  Site marked: site marked  Timeout: Immediately prior to procedure a time out was called to verify the correct patient, procedure, equipment, support staff and site/side marked as required   Supporting Documentation  Indications: pain   Procedure Details  Location: knee - R knee  Preparation: Patient was " prepped and draped in the usual sterile fashion  Needle size: 25 G  Approach: anteromedial  Medications administered: 160 mg methylPREDNISolone acetate 80 MG/ML; 2 mL lidocaine PF 1% 1 %  Patient tolerance: patient tolerated the procedure well with no immediate complications            Assessment:   Diagnoses and all orders for this visit:    1. Chronic pain of right knee (Primary)  -     Large Joint Arthrocentesis: R knee    2. Primary osteoarthritis of right knee  -     diclofenac (VOLTAREN) 75 MG EC tablet; Take 1 tablet by mouth 2 (Two) Times a Day As Needed (pain and discomfort).  Dispense: 60 tablet; Refill: 2          Plan:   Injected patient's right knee joint(s)with Depo-Medrol from an anteromedial approach   Compression/brace   Rest, ice, compression, and elevation (RICE) therapy  OTC Alternate Ibuprofen and Tylenol as needed  Follow up in 3 month(s)    Date of encounter: 12/13/2023   Forrest Keith MD

## 2023-12-21 RX ORDER — LIDOCAINE HYDROCHLORIDE 10 MG/ML
2 INJECTION, SOLUTION EPIDURAL; INFILTRATION; INTRACAUDAL; PERINEURAL
Status: COMPLETED | OUTPATIENT
Start: 2023-12-13 | End: 2023-12-13

## 2023-12-21 RX ORDER — METHYLPREDNISOLONE ACETATE 80 MG/ML
160 INJECTION, SUSPENSION INTRA-ARTICULAR; INTRALESIONAL; INTRAMUSCULAR; SOFT TISSUE
Status: COMPLETED | OUTPATIENT
Start: 2023-12-13 | End: 2023-12-13

## 2024-03-13 ENCOUNTER — TRANSCRIBE ORDERS (OUTPATIENT)
Dept: HOME HEALTH SERVICES | Facility: HOME HEALTHCARE | Age: 79
End: 2024-03-13
Payer: MEDICARE

## 2024-03-13 ENCOUNTER — DOCUMENTATION (OUTPATIENT)
Dept: HOME HEALTH SERVICES | Facility: HOME HEALTHCARE | Age: 79
End: 2024-03-13
Payer: MEDICARE

## 2024-03-13 ENCOUNTER — HOME HEALTH ADMISSION (OUTPATIENT)
Dept: HOME HEALTH SERVICES | Facility: HOME HEALTHCARE | Age: 79
End: 2024-03-13
Payer: COMMERCIAL

## 2024-03-13 DIAGNOSIS — Z96.651 AFTERCARE FOLLOWING RIGHT KNEE JOINT REPLACEMENT SURGERY: Primary | ICD-10-CM

## 2024-03-13 DIAGNOSIS — Z47.1 AFTERCARE FOLLOWING RIGHT KNEE JOINT REPLACEMENT SURGERY: Primary | ICD-10-CM

## 2024-03-13 NOTE — PROGRESS NOTES
Ortho Referral -   Amauri Espinal - [birth year] - Medicare -   Right TKR on 3/13/24 at [facility] -   PT SOC 3/15    Dr. Yovanny Petty is the ordering and the attending provider  [facility]    Outpatient therapy begins 3/27/24 at  [facility]    I will upload (into epic) the op note and the f2f/progress note, or office visit note when it becomes available from the surgical center or MD office.     Address confirmed:  [address] [place] [address]  [place] [id number]    Contact:  [phone]    I spoke with the patient and he is agreeable to home health PT, admission of 3/15 and does not have any other skilled services in the home at this time.

## 2024-03-15 ENCOUNTER — HOME CARE VISIT (OUTPATIENT)
Dept: HOME HEALTH SERVICES | Facility: HOME HEALTHCARE | Age: 79
End: 2024-03-15
Payer: MEDICARE

## 2024-03-15 ENCOUNTER — HOME CARE VISIT (OUTPATIENT)
Dept: HOME HEALTH SERVICES | Facility: HOME HEALTHCARE | Age: 79
End: 2024-03-15
Payer: COMMERCIAL

## 2024-03-15 VITALS
TEMPERATURE: 98 F | OXYGEN SATURATION: 97 % | HEART RATE: 57 BPM | SYSTOLIC BLOOD PRESSURE: 90 MMHG | DIASTOLIC BLOOD PRESSURE: 60 MMHG | RESPIRATION RATE: 18 BRPM

## 2024-03-15 PROCEDURE — G0151 HHCP-SERV OF PT,EA 15 MIN: HCPCS

## 2024-03-15 NOTE — HOME HEALTH
Reason for Hosp/Primary Dx/ Comorbidities:  R total knee replacement    Focus of Care: R TKR    Skilled Need: Skilled PT needed to improve patient's functional mobility, improve safety for transfers and ambulation and return patient to OF.        Current Functional status/mobility/DME: Upon eval patient requires MIn A for transfers, Min A for gait with a STC.  HB status/Living Arrangements: Due to dec act tolerance, weakness, decline in transfers and ambulation. Patient requires a taxing effort to leave home, putting patient at risk for falls or injury. Patient lives with spouse. steps to get in and out of the home      Skin Integrity/wound status: dressing intact      Code Status: Full Code    Fall Risk: High    POC confirmed with Dr. Luigi Petty II on 3.15.24    Educated on Emergency Plan, steps to take prior to going to the ER and when to Call Home Health First:  yes     Medication issues/Concerns: none    Additional Problems/Concerns:  none    Plan for next visit: HEP teaching, gait training    Patient goal: to get back to normal    Home Health disciplines ordered:   PT SOC + 3w1,2w1

## 2024-03-18 ENCOUNTER — HOME CARE VISIT (OUTPATIENT)
Dept: HOME HEALTH SERVICES | Facility: HOME HEALTHCARE | Age: 79
End: 2024-03-18
Payer: MEDICARE

## 2024-03-18 VITALS
TEMPERATURE: 97.3 F | DIASTOLIC BLOOD PRESSURE: 68 MMHG | HEART RATE: 83 BPM | OXYGEN SATURATION: 95 % | SYSTOLIC BLOOD PRESSURE: 128 MMHG | RESPIRATION RATE: 14 BRPM

## 2024-03-18 PROCEDURE — G0157 HHC PT ASSISTANT EA 15: HCPCS

## 2024-03-20 ENCOUNTER — HOME CARE VISIT (OUTPATIENT)
Dept: HOME HEALTH SERVICES | Facility: HOME HEALTHCARE | Age: 79
End: 2024-03-20
Payer: MEDICARE

## 2024-03-20 VITALS
DIASTOLIC BLOOD PRESSURE: 68 MMHG | HEART RATE: 78 BPM | SYSTOLIC BLOOD PRESSURE: 130 MMHG | OXYGEN SATURATION: 95 % | TEMPERATURE: 97.7 F | RESPIRATION RATE: 15 BRPM

## 2024-03-20 PROCEDURE — G0157 HHC PT ASSISTANT EA 15: HCPCS

## 2024-03-21 NOTE — HOME HEALTH
pt sitting up and ready for PT session,   feels fair today and with only minimal R knee pain.    dressing to be removed per md orders,  see wounds area.     pt reports he last took pain meds x 2 days ago.    pt currently not using an a.d. for ambulation.    no med changes or complications reported.       pt remains tolerant,   motivated to improve and participated well today.   pt had mild pain increases at end rom with ther ex and required instruction to perform correctly.         pt was cued to properly flex the R knee during swing phase with encouragement to use the cane to allow improved rom.           plan for next session-  cont PT with gait trg,  hep review, rom and strength trg

## 2024-03-22 ENCOUNTER — HOME CARE VISIT (OUTPATIENT)
Dept: HOME HEALTH SERVICES | Facility: HOME HEALTHCARE | Age: 79
End: 2024-03-22
Payer: COMMERCIAL

## 2024-03-22 VITALS
TEMPERATURE: 98.3 F | RESPIRATION RATE: 15 BRPM | SYSTOLIC BLOOD PRESSURE: 130 MMHG | DIASTOLIC BLOOD PRESSURE: 60 MMHG | HEART RATE: 83 BPM | OXYGEN SATURATION: 96 %

## 2024-03-22 PROCEDURE — G0157 HHC PT ASSISTANT EA 15: HCPCS

## 2024-03-22 NOTE — HOME HEALTH
pt sitting up and with no mentioned c/o's,  continues to have R knee pain but not significant.  pt continues to use the walker,  taking pain meds prn.      pt is changing the dressing daily,  presents today with very mild drainage noted and changed this dressing 2 hours prior to PT.       pt was limited but compliant and was able to perform the requested activities with only mild pain increases.   rom/strength continue to improve and pt continues to ambulate better with improving rom.     encouraged continued elevation and use of ice for edema management       plan for next session-  cont PT with gait trg,   transfer trg and rom/strengthening as tolerated Yes

## 2024-03-25 ENCOUNTER — HOME CARE VISIT (OUTPATIENT)
Dept: HOME HEALTH SERVICES | Facility: HOME HEALTHCARE | Age: 79
End: 2024-03-25
Payer: COMMERCIAL

## 2024-03-25 VITALS
HEART RATE: 17 BPM | SYSTOLIC BLOOD PRESSURE: 138 MMHG | RESPIRATION RATE: 15 BRPM | DIASTOLIC BLOOD PRESSURE: 70 MMHG | OXYGEN SATURATION: 95 %

## 2024-03-25 PROCEDURE — G0157 HHC PT ASSISTANT EA 15: HCPCS

## 2024-03-26 NOTE — HOME HEALTH
pt sitting up and is prepared for PT session-  feeling fair but with limited strength/endurance.    pt is motivated to improve and contiinue to advance.   pt reports he has not yet been contacted by OP PT,  pt was encouraged to call the surgeons office today to clarify the need for OP PT.   pt encouraged to participate with OP and informed most pts begin OP PT immediately following home PT.    incision covered with a dressing,   very minimal drainaged noted on the incision,   moderate blistering to R medial knee,  not draining and partially scabbed.        pt was compliant,  participated well and continues to advance.    pt was encouraged to continue hep review x 3 daily,  to ice and elevate and to continue regular ambulation throughout the day.    pt was fatigued to end PT sessin but was pleased to have partiicpated.         plan for next session-  possible dc to OP PT

## 2024-03-27 ENCOUNTER — HOME CARE VISIT (OUTPATIENT)
Dept: HOME HEALTH SERVICES | Facility: HOME HEALTHCARE | Age: 79
End: 2024-03-27
Payer: MEDICARE

## 2024-03-27 ENCOUNTER — HOME CARE VISIT (OUTPATIENT)
Dept: HOME HEALTH SERVICES | Facility: HOME HEALTHCARE | Age: 79
End: 2024-03-27
Payer: COMMERCIAL

## 2024-03-27 VITALS
SYSTOLIC BLOOD PRESSURE: 130 MMHG | HEART RATE: 80 BPM | OXYGEN SATURATION: 95 % | DIASTOLIC BLOOD PRESSURE: 70 MMHG | TEMPERATURE: 98 F

## 2024-03-27 PROCEDURE — G0151 HHCP-SERV OF PT,EA 15 MIN: HCPCS
